# Patient Record
Sex: FEMALE | Race: BLACK OR AFRICAN AMERICAN | NOT HISPANIC OR LATINO | Employment: OTHER | URBAN - METROPOLITAN AREA
[De-identification: names, ages, dates, MRNs, and addresses within clinical notes are randomized per-mention and may not be internally consistent; named-entity substitution may affect disease eponyms.]

---

## 2019-06-07 ENCOUNTER — OFFICE VISIT (OUTPATIENT)
Dept: RHEUMATOLOGY | Facility: CLINIC | Age: 69
End: 2019-06-07
Payer: MEDICARE

## 2019-06-07 VITALS
BODY MASS INDEX: 32.66 KG/M2 | WEIGHT: 177.5 LBS | HEART RATE: 55 BPM | DIASTOLIC BLOOD PRESSURE: 81 MMHG | HEIGHT: 62 IN | SYSTOLIC BLOOD PRESSURE: 131 MMHG

## 2019-06-07 DIAGNOSIS — M17.0 PRIMARY OSTEOARTHRITIS OF BOTH KNEES: Primary | ICD-10-CM

## 2019-06-07 PROCEDURE — 20610 LARGE JOINT ASPIRATION/INJECTION: R KNEE, L KNEE: ICD-10-PCS | Mod: 50,S$PBB,, | Performed by: INTERNAL MEDICINE

## 2019-06-07 PROCEDURE — 99204 PR OFFICE/OUTPT VISIT, NEW, LEVL IV, 45-59 MIN: ICD-10-PCS | Mod: 25,S$PBB,, | Performed by: INTERNAL MEDICINE

## 2019-06-07 PROCEDURE — 20610 DRAIN/INJ JOINT/BURSA W/O US: CPT | Mod: 50,PBBFAC | Performed by: INTERNAL MEDICINE

## 2019-06-07 PROCEDURE — 99204 OFFICE O/P NEW MOD 45 MIN: CPT | Mod: 25,S$PBB,, | Performed by: INTERNAL MEDICINE

## 2019-06-07 PROCEDURE — 99999 PR PBB SHADOW E&M-NEW PATIENT-LVL III: CPT | Mod: PBBFAC,,, | Performed by: INTERNAL MEDICINE

## 2019-06-07 PROCEDURE — 99999 PR PBB SHADOW E&M-NEW PATIENT-LVL III: ICD-10-PCS | Mod: PBBFAC,,, | Performed by: INTERNAL MEDICINE

## 2019-06-07 PROCEDURE — 99203 OFFICE O/P NEW LOW 30 MIN: CPT | Mod: PBBFAC | Performed by: INTERNAL MEDICINE

## 2019-06-07 RX ORDER — TRIAMCINOLONE ACETONIDE 40 MG/ML
40 INJECTION, SUSPENSION INTRA-ARTICULAR; INTRAMUSCULAR
Status: DISCONTINUED | OUTPATIENT
Start: 2019-06-07 | End: 2019-06-07 | Stop reason: HOSPADM

## 2019-06-07 RX ORDER — METOPROLOL SUCCINATE 100 MG/1
100 TABLET, EXTENDED RELEASE ORAL DAILY
COMMUNITY

## 2019-06-07 RX ORDER — ENALAPRIL MALEATE AND HYDROCHLOROTHIAZIDE 10; 25 MG/1; MG/1
25 TABLET ORAL DAILY
COMMUNITY
Start: 2019-03-06

## 2019-06-07 RX ORDER — SIMVASTATIN 20 MG/1
20 TABLET, FILM COATED ORAL NIGHTLY
COMMUNITY

## 2019-06-07 RX ORDER — AMLODIPINE BESYLATE 10 MG/1
10 TABLET ORAL DAILY
COMMUNITY
Start: 2019-05-28

## 2019-06-07 RX ADMIN — TRIAMCINOLONE ACETONIDE 40 MG: 40 INJECTION, SUSPENSION INTRA-ARTICULAR; INTRAMUSCULAR at 11:06

## 2019-06-07 NOTE — PATIENT INSTRUCTIONS
How Your Knee Works  A healthy knee bends easily and rotates slightly. The joint absorbs stress and moves smoothly. This allows you to walk, squat, and turn without pain.    A healthy knee  The knee is a hinge joint, formed where the thighbone (femur) and the shinbone (tibia) meet. It is the largest joint in the body. The joint is covered with smooth tissue and powered by large muscles. When all the parts listed below are healthy, a knee should move easily:  · Cartilage is a layer of smooth tissue. It covers the ends of the thighbone and shinbone. It also lines the back side of the kneecap. Healthy cartilage absorbs stress and allows the knee to bend easily.  · Muscles power the knee and leg for movement.  · Tendons attach the muscles to the bones.  · Ligaments are bands of tissue that connect bones and brace the joint.  · Bones that make up your knee joint include your thighbone (femur), shinbone (tibia), and kneecap (patella).  · Menisci are 2 wedge shaped pieces of cartilage that absorb shock between the thighbone and shinbone.  Date Last Reviewed: 9/20/2015  © 0423-7304 [employer]. [address] [place] [address]. All rights reserved. This information is not intended as a substitute for professional medical care. Always follow your healthcare professional's instructions.        Understanding Osteoarthritis of the Knee    A joint is a place where two bones meet. The knee is called a hinge joint. This joint is formed where the thighbone (femur) meets the shinbone (tibia). A healthy knee joint bends freely. Knee osteoarthritis is a condition where parts of the knee joint wear out. This can lead to pain, stiffness, and limited movement.   What is osteoarthritis?  Every joint contains a smooth tissue called cartilage. Cartilage cushions the ends of bones and helps bones in a joint glide smoothly against each other. Knee osteoarthritis occurs when cartilage in the knee joint begins to break  down and wear away. Bones may become exposed and rub together. The cartilage may become irritated and rough. This prevents smooth movement of the joint and can lead to pain.  Causes of osteoarthritis of the knee  Causes can include:  · Wear and tear from normal use over time  · Overuse of the knee during sports or work activities  · Being overweight. This increases stress on the knee joint.  · Misalignment of the knee joint  · Injury to the knee  Symptoms of osteoarthritis of the knee  Common symptoms include:  · Pain and swelling around the joint. The pain and swelling get worse with activity and better with rest.  · Grinding sound when moving the knee  · Reduced knee movement  · Knee stiffness. This is often worse first thing in the morning.  Treating osteoarthritis of the knee  Osteoarthritis is a long-term condition. Treatment usually focuses on managing symptoms. Treatment may include:  · Over-the-counter or prescription medicines taken by mouth to help relieve pain and swelling  · Injections of medicine into the joint to help relieve symptoms for a time  · A weight-loss plan for people who are overweight  · A plan of physical therapy and exercises to improve the strength and flexibility of the muscles around the knee  · Heat or cold therapy to help relieve pain and stiffness  · Assistive devices that help with movement, such as a cane or a walker  · Assistive devices that make activities of daily life easier, such as raised toilet seats or shower bars  If other treatments dont do enough to relieve symptoms, you may need surgery to replace the joint. During this surgery, the damaged joint is removed. An artificial knee joint is then put into place. This can help relieve pain and stiffness and restore movement of the knee.     When to call your healthcare provider  Call your healthcare provider right away if you have any of these:  · Fever of 100.4°F (38°C) or higher, or as directed  · Symptoms that dont get  better with prescribed medicines or get worse  · New symptoms   Date Last Reviewed: 3/10/2016  © 4093-5971 [employer]. [address] [place] [address], [place] [address]. All rights reserved. This information is not intended as a substitute for professional medical care. Always follow your healthcare professional's instructions.        Osteoarthritis: Coping with Pain    There are many ways to control your pain. Youre making a good start by learning about osteoarthritis and its treatments. Knowing more about this condition helps you work with your healthcare provider to find answers to problems. Keeping a positive outlook can help you manage pain from day to day. And making time each day to relax and enjoy yourself may help you control osteoarthritis pain, instead of letting it control you. Try these methods to help you cope with, and even reduce, your pain.  Take control  Relaxing may help relieve muscle aches that result from joint pain. To relax, try these techniques:  · Breathe slowly and calmly and think of a peaceful scene.  · Meditate by focusing your mind on one word, object, or idea.  Getting plenty of sleep can help reduce pain and let you function better. If pain is making it hard for you to sleep, ask your doctor about ways to control pain and ensure a good nights sleep. Cutting back on caffeine and alcohol can help you sleep better. So can going to bed and getting up at about the same time every day.  Use distraction  Getting your mind off the pain may seem hard to do. But it can actually help reduce pain. When you are in pain, try one of these ways of distracting yourself:  · Watch a funny movie with a friend.  · Listen to music you enjoy.  · Read a novel.  · Talk with friends or family.  · Go to a museum, park, or other favorite attraction.  · Arrange to do a regular activity, such as volunteer work.  Heat and cold  Using heat and cold treatments are simple ways to lessen arthritis  symptoms:  · Heat soothes stiff joints and tired muscles. Heat works well before exercise, for example. Heat treatments include:  ¨ A warm shower or baths, or soak (for example, fill the sink with warm water and move your fingers, hands, and wrists around in the water)  ¨ A moist heating pad  ¨ A warm, moist wash cloth  ¨ An electric blanket or throw  · Cold treatments help to numb painful areas and decrease swelling. Cold treatments include the following wrapped in a thin towel:  ¨ An ice pack or bag of ice  ¨ A gel-filled cold pack  ¨ A bag of frozen vegetables, like peas or corn  Be careful when using heat or cold. You can injure your skin. Each treatment should only last for 10 to 20 minutes. Your healthcare provider or therapist can give you specific instructions.  Acupuncture  Acupuncture is a 2000-year-old practice. Practitioners insert thin needles in specific parts of the body. Research shows that it can help to relieve the pain of arthritis.  For more information or to find a practitioner in your area, contact the American Academy of Medical Acupuncture. Its website is: http://www.medicalacupuncture.org/.  Massage  Therapeutic massage has many benefits. It may:  · Help you and your muscles relax  · Improve blood flow to muscles and joints  · Help joints stay more flexible  Look for a certified massage therapist. Many are trained to treat sore muscles and joint pain and stiffness.  Vitamins, supplements, and herbs  People with arthritis, or other long-term conditions that cause pain, often look for alternative ways to lessen pain. Vitamins, supplements, and herbs may or may not help you to feel better. Before you try any vitamin, supplement, or herb, make sure you ask your healthcare provider or pharmacist.  Physical therapy/occupational therapy  Evaluation by a physical therapist and or occupational therapist for assessment for limitations in activities of daily living  Assistance with developing an  appropriate exercise routine for both muscle strengthening and cardiovascular health  Weight management  Studies have demonstrated that weight loss in overweight individuals can improve osteoarthritis symptoms.  Talk with your healthcare provider regarding your optimal weight and techniques for weight management if necessary.  Psychological treatments  Research shows that many psychological therapies or those that deal with thinking and emotions, help people cope with arthritis pain. Therapies include cognitive-behavioral therapy (CBT), pain coping skills training, biofeedback, stress management, and hypnosis. Ask your healthcare provider for more information about these therapies.  For more information about many of these methods, contact the National Center for Complementary and Alternative Medicine (NCCAM) at http://www.ncc.nih.gov.   Date Last Reviewed: 2/14/2016 © 2000-2017 [employer]. [address] [place] [address], [place] [address]. All rights reserved. This information is not intended as a substitute for professional medical care. Always follow your healthcare professional's instructions.        Osteoarthritis: Natural and Alternative Treatments     Therapeutic massage is one alternative treatment option.   The treatment for osteoarthritis includes lifestyle changes like weight loss and exercise. Medicines and surgery may also be part of the treatment. There are also many natural and alternative treatments. These treatments may also help relieve pain and stiffness caused by osteoarthritis.  Heat and cold  Using heat and cold treatments are simple ways to lessen arthritis symptoms:  · Heat soothes stiff joints and tired muscles. Heat works well before exercise, for example. Heat treatments include:  ¨ A warm shower or baths, or soak (for example, fill the sink with warm water and move your fingers, hands, and wrists around in the water)  ¨ A moist heating pad  ¨ A warm, moist wash cloth  ¨ An  electric blanket or throw  · Cold treatments help to numb painful areas and decrease swelling. Cold treatments include the following wrapped in a thin towel:  ¨ An ice pack or bag of ice  ¨ A gel-filled cold pack  ¨ A bag of frozen vegetables, like peas or corn  Be careful when using heat or cold. You can injure your skin. Each treatment should only last for 10 to 20 minutes. Your healthcare provider or therapist can give you specific instructions.     Meditation and relaxation  Meditation and relaxation can help you deal with arthritis pain. There are many different methods available including deep breathing exercises, meditation, and yoga. Look for information and programs on the Internet or in your community. Or try this simple deep breathing technique sometimes called belly breathin. Sit in a comfortable chair or lie on your back.   2. Put one hand on your chest and the other hand on your stomach.  3. Take a breath in through your nose. The hand on your stomach should rise. The hand on your chest should move very little.  4. Breathe out through your mouth, pushing out as much air as you can. The hand on your stomach should move in as you breathe out, but the hand on your chest should move very little. You should feel the muscles of your stomach tighten.   5. Continue to breathe in through your nose and out through your mouth. You should feel your stomach rise and fall. Count slowly each time you breathe out.  Acupuncture  Acupuncture is a 2000-year-old practice. Practitioners insert thin needles in specific parts of the body. Research shows that it can help to relieve the pain of arthritis.   For more information or to find a practitioner in your area, contact the American Academy of Medical Acupuncture. Its website is: http://www.medicalacupuncture.org/.  Massage  Therapeutic massage has many benefits. It may:  · Help you and your muscles relax  · Improve blood flow to muscles and joints  · Help joints  stay more flexible.  Look for a certified massage therapist. Many are trained to treat sore muscles and joint pain and stiffness.  Vitamins, supplements, and herbs  People with arthritis, or other long-term conditions that cause pain, often look for alternative ways to lessen pain. Vitamins, supplements, and herbs may or may not help you to feel better. Before you try any vitamin, supplement, or herb, make sure you ask your healthcare provider or pharmacist.  Physical therapy/occupational therapy  · Evaluation by a physical therapist and or occupational therapist for assessment for limitations in activities of daily living  · Assistance with developing an appropriate exercise routine for both muscle strengthening and cardiovascular health  Weight management  · Studies have demonstrated that weight loss in overweight individuals can improve osteoarthritis symptoms  · Talk with your healthcare provider regarding your optimal weight and techniques for weight management if necessary.   Psychological treatments  Research shows that many psychological therapies or those that deal with thinking and emotions, help people cope with arthritis pain. Therapies include: cognitive-behavioral therapy (CBT), pain coping skills training, biofeedback, stress management, and hypnosis. Ask your healthcare provider for more information about these therapies.  For more information about many of these methods, contact the National Center for Complementary and Alternative Medicine at http://www.nccam.nih.gov.  Date Last Reviewed: 2/14/2016 © 2000-2017 [employer]. [address] [place] [address], [place] [address]. All rights reserved. This information is not intended as a substitute for professional medical care. Always follow your healthcare professional's instructions.        Osteoarthritis: Natural and Alternative Treatments     Therapeutic massage is one alternative treatment option.   The treatment for osteoarthritis includes  lifestyle changes like weight loss and exercise. Medicines and surgery may also be part of the treatment. There are also many natural and alternative treatments. These treatments may also help relieve pain and stiffness caused by osteoarthritis.  Heat and cold  Using heat and cold treatments are simple ways to lessen arthritis symptoms:  · Heat soothes stiff joints and tired muscles. Heat works well before exercise, for example. Heat treatments include:  ¨ A warm shower or baths, or soak (for example, fill the sink with warm water and move your fingers, hands, and wrists around in the water)  ¨ A moist heating pad  ¨ A warm, moist wash cloth  ¨ An electric blanket or throw  · Cold treatments help to numb painful areas and decrease swelling. Cold treatments include the following wrapped in a thin towel:  ¨ An ice pack or bag of ice  ¨ A gel-filled cold pack  ¨ A bag of frozen vegetables, like peas or corn  Be careful when using heat or cold. You can injure your skin. Each treatment should only last for 10 to 20 minutes. Your healthcare provider or therapist can give you specific instructions.     Meditation and relaxation  Meditation and relaxation can help you deal with arthritis pain. There are many different methods available including deep breathing exercises, meditation, and yoga. Look for information and programs on the Internet or in your community. Or try this simple deep breathing technique sometimes called belly breathin. Sit in a comfortable chair or lie on your back.   7. Put one hand on your chest and the other hand on your stomach.  8. Take a breath in through your nose. The hand on your stomach should rise. The hand on your chest should move very little.  9. Breathe out through your mouth, pushing out as much air as you can. The hand on your stomach should move in as you breathe out, but the hand on your chest should move very little. You should feel the muscles of your stomach  tighten.   10. Continue to breathe in through your nose and out through your mouth. You should feel your stomach rise and fall. Count slowly each time you breathe out.  Acupuncture  Acupuncture is a 2000-year-old practice. Practitioners insert thin needles in specific parts of the body. Research shows that it can help to relieve the pain of arthritis.   For more information or to find a practitioner in your area, contact the American Academy of Medical Acupuncture. Its website is: http://www.medicalacupuncture.org/.  Massage  Therapeutic massage has many benefits. It may:  · Help you and your muscles relax  · Improve blood flow to muscles and joints  · Help joints stay more flexible.  Look for a certified massage therapist. Many are trained to treat sore muscles and joint pain and stiffness.  Vitamins, supplements, and herbs  People with arthritis, or other long-term conditions that cause pain, often look for alternative ways to lessen pain. Vitamins, supplements, and herbs may or may not help you to feel better. Before you try any vitamin, supplement, or herb, make sure you ask your healthcare provider or pharmacist.  Physical therapy/occupational therapy  · Evaluation by a physical therapist and or occupational therapist for assessment for limitations in activities of daily living  · Assistance with developing an appropriate exercise routine for both muscle strengthening and cardiovascular health  Weight management  · Studies have demonstrated that weight loss in overweight individuals can improve osteoarthritis symptoms  · Talk with your healthcare provider regarding your optimal weight and techniques for weight management if necessary.   Psychological treatments  Research shows that many psychological therapies or those that deal with thinking and emotions, help people cope with arthritis pain. Therapies include: cognitive-behavioral therapy (CBT), pain coping skills training, biofeedback, stress management, and  hypnosis. Ask your healthcare provider for more information about these therapies.  For more information about many of these methods, contact the National Center for Complementary and Alternative Medicine at http://www.nccam.nih.gov.  Date Last Reviewed: 2/14/2016 © 2000-2017 [employer]. [address] [place] [address], [place] [address]. All rights reserved. This information is not intended as a substitute for professional medical care. Always follow your healthcare professional's instructions.

## 2019-06-07 NOTE — ASSESSMENT & PLAN NOTE
Worsening.  Mild effusion.  Inject Kenalog.  If no improvement consider hyaluronic acid injections.

## 2019-06-07 NOTE — PROGRESS NOTES
"RHEUMATOLOGY CLINIC INITIAL VISIT    Chief complaints:-  My knees hurt.    HPI:-  Betzaida De a 68 y.o. pleasant female comes in for an initial visit with above chief complaints.  Gradual onset, chronic, progressive, intermittent, achy, swelling, stiff, right more than left for past several years.  Not improving on topical medications, Tylenol, anti-inflammatories indications including BC powder.  No pain over other joints today.  No prolonged morning stiffness over small joints of hands or feet.  No photosensitive malar rash, sicca syndrome or Raynaud's phenomenon.    Review of Systems   Constitutional: Negative for chills and fever.   HENT: Negative for congestion and sore throat.    Eyes: Negative for blurred vision and redness.   Respiratory: Negative for cough and shortness of breath.    Cardiovascular: Negative for chest pain and leg swelling.   Gastrointestinal: Negative for abdominal pain.   Genitourinary: Negative for dysuria.   Musculoskeletal: Positive for joint pain. Negative for back pain, falls, myalgias and neck pain.   Skin: Negative for rash.   Neurological: Negative for headaches.   Endo/Heme/Allergies: Does not bruise/bleed easily.   Psychiatric/Behavioral: Negative for memory loss. The patient does not have insomnia.        History reviewed. No pertinent past medical history.    History reviewed. No pertinent surgical history.     Social History     Tobacco Use    Smoking status: Never Smoker   Substance Use Topics    Alcohol use: Yes     Frequency: Monthly or less    Drug use: Never       History reviewed. No pertinent family history.    Review of patient's allergies indicates:  No Known Allergies    Vitals:    06/07/19 1029   BP: 131/81   Pulse: (!) 55   Weight: 80.5 kg (177 lb 7.5 oz)   Height: 5' 2" (1.575 m)   PainSc:   5       Physical Exam   Constitutional: She is oriented to person, place, and time and well-developed, well-nourished, and in no distress. No distress.   HENT:   Head: " Normocephalic.   Mouth/Throat: Oropharynx is clear and moist.   Eyes: Pupils are equal, round, and reactive to light. Conjunctivae and EOM are normal.   Neck: Normal range of motion. Neck supple.   Cardiovascular: Normal rate and intact distal pulses.   Pulmonary/Chest: Effort normal. No respiratory distress.   Abdominal: Soft. There is no tenderness.   Musculoskeletal:   No synovitis over small joints of hands or feet.  No effusion over large joints.  Mild joint line tenderness over bilateral knees with mild effusion on the right.   Neurological: She is alert and oriented to person, place, and time. No cranial nerve deficit.   Skin: Skin is warm. No rash noted. No erythema.   Psychiatric: Mood and affect normal.   Nursing note and vitals reviewed.        Medication List with Changes/Refills   Current Medications    AMLODIPINE (NORVASC) 10 MG TABLET    Take 10 mg by mouth once daily.    ENALAPRIL-HYDROCHLOROTHIAZIDE (VASERETIC) 10-25 MG PER TABLET    Take 25 tablets by mouth once daily.    METOPROLOL SUCCINATE (TOPROL-XL) 100 MG 24 HR TABLET    Take 100 mg by mouth once daily.    SIMVASTATIN (ZOCOR) 20 MG TABLET    Take 20 mg by mouth every evening.       Assessment/Plans:-  1. Primary osteoarthritis of both knees      Problem List Items Addressed This Visit        Orthopedic    Primary osteoarthritis of both knees - Primary    Overview     Failed topical medications, Tylenol, anti-inflammatory over-the-counter medications and takes BC powder.         Current Assessment & Plan     Worsening.  Mild effusion.  Inject Kenalog.  If no improvement consider hyaluronic acid injections.         Relevant Orders    Large Joint Aspiration/Injection: R knee, L knee        Large Joint Aspiration/Injection: R knee, L knee  Date/Time: 6/7/2019 11:03 AM  Performed by: Viraj Soliz MD  Authorized by: Viraj Soliz MD     Consent Done?:  Yes (Verbal)  Indications:  Pain and joint swelling  Procedure site marked:  Yes    Timeout: Prior to procedure the correct patient, procedure, and site was verified      Location:  Knee  Site:  R knee and L knee  Prep: Patient was prepped and draped in usual sterile fashion    Ultrasonic Guidance for needle placement: No  Needle size:  25 G  Approach:  Anterolateral  Medications:  40 mg triamcinolone acetonide 40 mg/mL; 40 mg triamcinolone acetonide 40 mg/mL  Patient tolerance:  Patient tolerated the procedure well with no immediate complications        Follow up in about 6 months (around 12/7/2019).    Thank you for allowing me to participate in the care ofChantelleclaudette Rogers.    Disclaimer: This note was prepared using voice recognition system and is likely to have sound alike errors and is not proof read.  Please call me with any questions.

## 2019-10-22 ENCOUNTER — OFFICE VISIT (OUTPATIENT)
Dept: RHEUMATOLOGY | Facility: CLINIC | Age: 69
End: 2019-10-22
Payer: MEDICARE

## 2019-10-22 VITALS
DIASTOLIC BLOOD PRESSURE: 81 MMHG | BODY MASS INDEX: 31.8 KG/M2 | HEIGHT: 62 IN | WEIGHT: 172.81 LBS | SYSTOLIC BLOOD PRESSURE: 141 MMHG | HEART RATE: 78 BPM

## 2019-10-22 DIAGNOSIS — M17.0 PRIMARY OSTEOARTHRITIS OF BOTH KNEES: Primary | ICD-10-CM

## 2019-10-22 PROCEDURE — 99214 PR OFFICE/OUTPT VISIT, EST, LEVL IV, 30-39 MIN: ICD-10-PCS | Mod: 25,S$PBB,, | Performed by: INTERNAL MEDICINE

## 2019-10-22 PROCEDURE — 99999 PR PBB SHADOW E&M-EST. PATIENT-LVL III: CPT | Mod: PBBFAC,,, | Performed by: INTERNAL MEDICINE

## 2019-10-22 PROCEDURE — 99999 PR PBB SHADOW E&M-EST. PATIENT-LVL III: ICD-10-PCS | Mod: PBBFAC,,, | Performed by: INTERNAL MEDICINE

## 2019-10-22 PROCEDURE — 99213 OFFICE O/P EST LOW 20 MIN: CPT | Mod: PBBFAC,25 | Performed by: INTERNAL MEDICINE

## 2019-10-22 PROCEDURE — 20610 DRAIN/INJ JOINT/BURSA W/O US: CPT | Mod: 50,PBBFAC | Performed by: INTERNAL MEDICINE

## 2019-10-22 PROCEDURE — 99214 OFFICE O/P EST MOD 30 MIN: CPT | Mod: 25,S$PBB,, | Performed by: INTERNAL MEDICINE

## 2019-10-22 PROCEDURE — 20610 LARGE JOINT ASPIRATION/INJECTION: R KNEE, L KNEE: ICD-10-PCS | Mod: 50,S$PBB,, | Performed by: INTERNAL MEDICINE

## 2019-10-22 RX ORDER — DICLOFENAC SODIUM 10 MG/G
4 GEL TOPICAL 4 TIMES DAILY PRN
Qty: 1 TUBE | Refills: 11 | Status: SHIPPED | OUTPATIENT
Start: 2019-10-22

## 2019-10-22 RX ORDER — TRIAMCINOLONE ACETONIDE 40 MG/ML
40 INJECTION, SUSPENSION INTRA-ARTICULAR; INTRAMUSCULAR
Status: DISCONTINUED | OUTPATIENT
Start: 2019-10-22 | End: 2019-10-22 | Stop reason: HOSPADM

## 2019-10-22 RX ADMIN — TRIAMCINOLONE ACETONIDE 40 MG: 40 INJECTION, SUSPENSION INTRA-ARTICULAR; INTRAMUSCULAR at 11:10

## 2019-10-22 NOTE — PROGRESS NOTES
RHEUMATOLOGY CLINIC FOLLOW UP VISIT  Chief complaints:-  To follow up for knee arthritis.     HPI:-  Betzaida De a 68 y.o. pleasant female comes in for a follow up visit. Knee pain- Gradual onset, chronic, progressive, intermittent, achy, swelling, stiff, right more than left for past several years.  Not improving on topical medications, Tylenol, anti-inflammatories indications including BC powder.  Significant improvement for 5 months after last joint injection. No pain over other joints today.  No prolonged morning stiffness over small joints of hands or feet.  No photosensitive malar rash, sicca syndrome or Raynaud's phenomenon.      Review of Systems   Constitutional: Negative for chills and fever.   HENT: Negative for congestion and sore throat.    Eyes: Negative for blurred vision and redness.   Respiratory: Negative for cough and shortness of breath.    Cardiovascular: Negative for chest pain and leg swelling.   Gastrointestinal: Negative for abdominal pain.   Genitourinary: Negative for dysuria.   Musculoskeletal: Positive for joint pain. Negative for back pain, falls, myalgias and neck pain.   Skin: Negative for rash.   Neurological: Negative for headaches.   Endo/Heme/Allergies: Does not bruise/bleed easily.   Psychiatric/Behavioral: Negative for memory loss. The patient does not have insomnia.        Past Medical History:   Diagnosis Date    Allergy     Hyperlipidemia     Hypertension        Past Surgical History:   Procedure Laterality Date     SECTION      CHOLECYSTECTOMY          Social History     Tobacco Use    Smoking status: Never Smoker   Substance Use Topics    Alcohol use: Yes     Frequency: Monthly or less    Drug use: Never       Family History   Problem Relation Age of Onset    Heart disease Mother     Heart disease Father     Diabetes Mellitus Father     Rheum arthritis Sister     No Known Problems Brother     No  "Known Problems Daughter     Stroke Sister     Hypertension Sister     No Known Problems Sister     No Known Problems Sister     No Known Problems Sister     Hypertension Son        Review of patient's allergies indicates:  No Known Allergies    Vitals:    10/22/19 1201   BP: (!) 141/81   Pulse: 78   Weight: 78.4 kg (172 lb 13.5 oz)   Height: 5' 2" (1.575 m)   PainSc:   2       Physical Exam   Constitutional: She is oriented to person, place, and time and well-developed, well-nourished, and in no distress. No distress.   HENT:   Head: Normocephalic.   Mouth/Throat: Oropharynx is clear and moist.   Eyes: Pupils are equal, round, and reactive to light. Conjunctivae and EOM are normal.   Neck: Normal range of motion. Neck supple.   Cardiovascular: Normal rate and intact distal pulses.   Pulmonary/Chest: Effort normal. No respiratory distress.   Abdominal: Soft. There is no tenderness.   Musculoskeletal:   No synovitis over small joints of hands or feet.  No effusion over large joints.  Mild joint line tenderness over bilateral knees with mild effusion on the right.   Neurological: She is alert and oriented to person, place, and time. No cranial nerve deficit.   Skin: Skin is warm. No rash noted. No erythema.   Psychiatric: Mood and affect normal.   Nursing note and vitals reviewed.      Medication List with Changes/Refills   New Medications    DICLOFENAC SODIUM (VOLTAREN) 1 % GEL    Apply 4 g topically 4 (four) times daily as needed.   Current Medications    AMLODIPINE (NORVASC) 10 MG TABLET    Take 10 mg by mouth once daily.    ENALAPRIL-HYDROCHLOROTHIAZIDE (VASERETIC) 10-25 MG PER TABLET    Take 25 tablets by mouth once daily.    METOPROLOL SUCCINATE (TOPROL-XL) 100 MG 24 HR TABLET    Take 100 mg by mouth once daily.    SIMVASTATIN (ZOCOR) 20 MG TABLET    Take 20 mg by mouth every evening.       Assessment/Plans:-  1. Primary osteoarthritis of both knees      Problem List Items Addressed This Visit        " Orthopedic    Primary osteoarthritis of both knees - Primary    Overview     Failed topical medications, Tylenol, anti-inflammatory over-the-counter medications and takes BC powder.         Current Assessment & Plan     Worsening.  Mild effusion. Repeat Kenalog.  If no improvement consider hyaluronic acid injections.         Relevant Medications    diclofenac sodium (VOLTAREN) 1 % Gel    Other Relevant Orders    Large Joint Aspiration/Injection: R knee, L knee      Large Joint Aspiration/Injection: R knee, L knee  Date/Time: 10/22/2019 11:45 AM  Performed by: Viraj Soliz MD  Authorized by: Viraj Soliz MD     Consent Done?:  Yes (Verbal)  Indications:  Pain and joint swelling  Procedure site marked: Yes    Timeout: Prior to procedure the correct patient, procedure, and site was verified    Anesthesia  Local anesthesia used  Anesthesia: local infiltration  Anesthetic: lidocaine 2% without epinephrine  Anesthetic total: 2mL    Location:  Knee  Site:  R knee and L knee  Prep: Patient was prepped and draped in usual sterile fashion    Needle size:  25 G  Ultrasonic Guidance for needle placement: No  Approach:  Anterolateral  Medications:  40 mg triamcinolone acetonide 40 mg/mL; 40 mg triamcinolone acetonide 40 mg/mL  Patient tolerance:  Patient tolerated the procedure well with no immediate complications        # Follow up in about 6 months (around 4/22/2020).      Disclaimer: This note was prepared using voice recognition system and is likely to have sound alike errors and is not proof read.  Please call me with any questions.

## 2019-10-22 NOTE — ASSESSMENT & PLAN NOTE
Worsening.  Mild effusion. Repeat Kenalog.  If no improvement consider hyaluronic acid injections.

## 2020-04-14 ENCOUNTER — TELEPHONE (OUTPATIENT)
Dept: RHEUMATOLOGY | Facility: CLINIC | Age: 70
End: 2020-04-14

## 2020-07-07 ENCOUNTER — TELEPHONE (OUTPATIENT)
Dept: RHEUMATOLOGY | Facility: CLINIC | Age: 70
End: 2020-07-07

## 2020-07-08 ENCOUNTER — OFFICE VISIT (OUTPATIENT)
Dept: RHEUMATOLOGY | Facility: CLINIC | Age: 70
End: 2020-07-08
Payer: MEDICARE

## 2020-07-08 VITALS
WEIGHT: 179.44 LBS | SYSTOLIC BLOOD PRESSURE: 124 MMHG | BODY MASS INDEX: 33.02 KG/M2 | DIASTOLIC BLOOD PRESSURE: 75 MMHG | HEIGHT: 62 IN | HEART RATE: 58 BPM

## 2020-07-08 DIAGNOSIS — M17.0 PRIMARY OSTEOARTHRITIS OF BOTH KNEES: Primary | ICD-10-CM

## 2020-07-08 PROCEDURE — 99213 OFFICE O/P EST LOW 20 MIN: CPT | Mod: PBBFAC,25 | Performed by: INTERNAL MEDICINE

## 2020-07-08 PROCEDURE — 99999 PR PBB SHADOW E&M-EST. PATIENT-LVL III: CPT | Mod: PBBFAC,,, | Performed by: INTERNAL MEDICINE

## 2020-07-08 PROCEDURE — 20610 DRAIN/INJ JOINT/BURSA W/O US: CPT | Mod: 50,PBBFAC | Performed by: INTERNAL MEDICINE

## 2020-07-08 PROCEDURE — 20610 LARGE JOINT ASPIRATION/INJECTION: BILATERAL KNEE: ICD-10-PCS | Mod: 50,S$PBB,, | Performed by: INTERNAL MEDICINE

## 2020-07-08 PROCEDURE — 99214 PR OFFICE/OUTPT VISIT, EST, LEVL IV, 30-39 MIN: ICD-10-PCS | Mod: 25,S$PBB,, | Performed by: INTERNAL MEDICINE

## 2020-07-08 PROCEDURE — 99999 PR PBB SHADOW E&M-EST. PATIENT-LVL III: ICD-10-PCS | Mod: PBBFAC,,, | Performed by: INTERNAL MEDICINE

## 2020-07-08 PROCEDURE — 99214 OFFICE O/P EST MOD 30 MIN: CPT | Mod: 25,S$PBB,, | Performed by: INTERNAL MEDICINE

## 2020-07-08 RX ORDER — TRIAMCINOLONE ACETONIDE 40 MG/ML
40 INJECTION, SUSPENSION INTRA-ARTICULAR; INTRAMUSCULAR
Status: DISCONTINUED | OUTPATIENT
Start: 2020-07-08 | End: 2020-07-08 | Stop reason: HOSPADM

## 2020-07-08 RX ADMIN — TRIAMCINOLONE ACETONIDE 40 MG: 40 INJECTION, SUSPENSION INTRA-ARTICULAR; INTRAMUSCULAR at 09:07

## 2020-07-08 NOTE — PROGRESS NOTES
RHEUMATOLOGY CLINIC FOLLOW UP VISIT  Chief complaints:-  To follow up for knee arthritis.     HPI:-  Betzaida De a 69 y.o. pleasant female comes in for a follow up visit. Knee pain- Gradual onset, chronic, progressive, intermittent, achy, swelling, stiff, right more than left for past several years.  Not improving on topical medications, Tylenol, anti-inflammatories indications including BC powder.  Significant improvement for 5 months after last joint injection. No pain over other joints today.  No prolonged morning stiffness over small joints of hands or feet.  No photosensitive malar rash, sicca syndrome or Raynaud's phenomenon.      Review of Systems   Constitutional: Negative for chills and fever.   HENT: Negative for congestion and sore throat.    Eyes: Negative for blurred vision and redness.   Respiratory: Negative for cough and shortness of breath.    Cardiovascular: Negative for chest pain and leg swelling.   Gastrointestinal: Negative for abdominal pain.   Genitourinary: Negative for dysuria.   Musculoskeletal: Positive for joint pain. Negative for back pain, falls, myalgias and neck pain.   Skin: Negative for rash.   Neurological: Negative for headaches.   Endo/Heme/Allergies: Does not bruise/bleed easily.   Psychiatric/Behavioral: Negative for memory loss. The patient does not have insomnia.        Past Medical History:   Diagnosis Date    Allergy     Hyperlipidemia     Hypertension        Past Surgical History:   Procedure Laterality Date     SECTION      CHOLECYSTECTOMY          Social History     Tobacco Use    Smoking status: Never Smoker   Substance Use Topics    Alcohol use: Yes     Frequency: Monthly or less    Drug use: Never       Family History   Problem Relation Age of Onset    Heart disease Mother     Heart disease Father     Diabetes Mellitus Father     Rheum arthritis Sister     No Known Problems Brother     No  "Known Problems Daughter     Stroke Sister     Hypertension Sister     No Known Problems Sister     No Known Problems Sister     No Known Problems Sister     Hypertension Son        Review of patient's allergies indicates:  No Known Allergies    Vitals:    07/08/20 0917   BP: 124/75   Pulse: (!) 58   Weight: 81.4 kg (179 lb 7.3 oz)   Height: 5' 2" (1.575 m)   PainSc:   5   PainLoc: Knee       Physical Exam   Constitutional: She is oriented to person, place, and time and well-developed, well-nourished, and in no distress. No distress.   HENT:   Head: Normocephalic.   Mouth/Throat: Oropharynx is clear and moist.   Eyes: Pupils are equal, round, and reactive to light. Conjunctivae and EOM are normal.   Neck: Normal range of motion. Neck supple.   Cardiovascular: Normal rate and intact distal pulses.   Pulmonary/Chest: Effort normal. No respiratory distress.   Abdominal: Soft. There is no abdominal tenderness.   Musculoskeletal:      Comments: No synovitis over small joints of hands or feet.  No effusion over large joints.  Mild joint line tenderness over bilateral knees with mild effusion on the right.   Neurological: She is alert and oriented to person, place, and time. No cranial nerve deficit.   Skin: Skin is warm. No rash noted. No erythema.   Psychiatric: Mood and affect normal.   Nursing note and vitals reviewed.      Medication List with Changes/Refills   Current Medications    AMLODIPINE (NORVASC) 10 MG TABLET    Take 10 mg by mouth once daily.    DICLOFENAC SODIUM (VOLTAREN) 1 % GEL    Apply 4 g topically 4 (four) times daily as needed.    ENALAPRIL-HYDROCHLOROTHIAZIDE (VASERETIC) 10-25 MG PER TABLET    Take 25 tablets by mouth once daily.    METOPROLOL SUCCINATE (TOPROL-XL) 100 MG 24 HR TABLET    Take 100 mg by mouth once daily.    SIMVASTATIN (ZOCOR) 20 MG TABLET    Take 20 mg by mouth every evening.       Assessment/Plans:-  1. Primary osteoarthritis of both knees      Problem List Items Addressed This " Visit     Primary osteoarthritis of both knees - Primary    Overview     Failed topical medications, Tylenol, anti-inflammatory over-the-counter medications and takes BC powder.         Current Assessment & Plan     Mild effusion.  Inject Kenalog.  Consider hyaluronic acid if no improvement         Relevant Orders    Large Joint Aspiration/Injection: bilateral knee      Large Joint Aspiration/Injection: bilateral knee    Date/Time: 7/8/2020 9:15 AM  Performed by: Viraj Soliz MD  Authorized by: Viraj Soliz MD     Consent Done?:  Yes (Verbal)  Indications:  Joint swelling and pain  Site marked: the procedure site was marked    Timeout: prior to procedure the correct patient, procedure, and site was verified    Prep: patient was prepped and draped in usual sterile fashion    Local anesthesia used?: No    Anesthesia:  Local infiltration  Local anesthetic:  Lidocaine 2% without epinephrine  Anesthetic total (ml):  2      Details:  Needle Size:  25 G  Ultrasonic Guidance for needle placement?: No    Approach:  Medial  Location:  Knee  Laterality:  Bilateral  Site:  Bilateral knee  Medications (Right):  40 mg triamcinolone acetonide 40 mg/mL  Medications (Left):  40 mg triamcinolone acetonide 40 mg/mL  Patient tolerance:  Patient tolerated the procedure well with no immediate complications        # Follow up in about 6 months (around 1/8/2021).      Disclaimer: This note was prepared using voice recognition system and is likely to have sound alike errors and is not proof read.  Please call me with any questions.

## 2021-05-13 ENCOUNTER — TELEPHONE (OUTPATIENT)
Dept: RHEUMATOLOGY | Facility: CLINIC | Age: 71
End: 2021-05-13

## 2021-06-09 ENCOUNTER — TELEPHONE (OUTPATIENT)
Dept: RHEUMATOLOGY | Facility: CLINIC | Age: 71
End: 2021-06-09

## 2021-06-09 ENCOUNTER — PROCEDURE VISIT (OUTPATIENT)
Dept: RHEUMATOLOGY | Facility: CLINIC | Age: 71
End: 2021-06-09
Payer: MEDICARE

## 2021-06-09 DIAGNOSIS — M17.0 PRIMARY OSTEOARTHRITIS OF BOTH KNEES: Primary | ICD-10-CM

## 2021-06-09 PROCEDURE — 20610 LARGE JOINT ASPIRATION/INJECTION: BILATERAL KNEE: ICD-10-PCS | Mod: 50,S$PBB,, | Performed by: INTERNAL MEDICINE

## 2021-06-09 PROCEDURE — 20610 DRAIN/INJ JOINT/BURSA W/O US: CPT | Mod: 50,PBBFAC | Performed by: INTERNAL MEDICINE

## 2021-06-09 RX ORDER — TRIAMCINOLONE ACETONIDE 40 MG/ML
40 INJECTION, SUSPENSION INTRA-ARTICULAR; INTRAMUSCULAR
Status: DISCONTINUED | OUTPATIENT
Start: 2021-06-09 | End: 2021-06-09 | Stop reason: HOSPADM

## 2021-06-09 RX ADMIN — TRIAMCINOLONE ACETONIDE 40 MG: 40 INJECTION, SUSPENSION INTRA-ARTICULAR; INTRAMUSCULAR at 04:06

## 2024-07-15 ENCOUNTER — TELEPHONE (OUTPATIENT)
Dept: PAIN MEDICINE | Facility: CLINIC | Age: 74
End: 2024-07-15
Payer: MEDICARE

## 2024-07-15 NOTE — TELEPHONE ENCOUNTER
Reached out to patient to schedule appointment from messages. Apt has been made.   Pt understand. All questions answered.     Jose Shaikh  Medical Assistant

## 2024-07-15 NOTE — TELEPHONE ENCOUNTER
----- Message from Yolanda Burns sent at 7/15/2024 11:21 AM CDT -----  Name of Who is Calling:pt           What is the request in detail: patient requesting sooner appt than next available scheduled in Sep. Patient is in severe pain and was placed on wait list.           Can the clinic reply by [facility]:no           What Number to Call Back if not in [facility]: [phone]